# Patient Record
Sex: FEMALE | Race: WHITE | NOT HISPANIC OR LATINO | URBAN - METROPOLITAN AREA
[De-identification: names, ages, dates, MRNs, and addresses within clinical notes are randomized per-mention and may not be internally consistent; named-entity substitution may affect disease eponyms.]

---

## 2017-10-06 ENCOUNTER — IMPORTED ENCOUNTER (OUTPATIENT)
Dept: URBAN - METROPOLITAN AREA CLINIC 50 | Facility: CLINIC | Age: 67
End: 2017-10-06

## 2017-11-06 ENCOUNTER — IMPORTED ENCOUNTER (OUTPATIENT)
Dept: URBAN - METROPOLITAN AREA CLINIC 50 | Facility: CLINIC | Age: 67
End: 2017-11-06

## 2018-03-15 ENCOUNTER — IMPORTED ENCOUNTER (OUTPATIENT)
Dept: URBAN - METROPOLITAN AREA CLINIC 50 | Facility: CLINIC | Age: 68
End: 2018-03-15

## 2018-03-19 ENCOUNTER — IMPORTED ENCOUNTER (OUTPATIENT)
Dept: URBAN - METROPOLITAN AREA CLINIC 50 | Facility: CLINIC | Age: 68
End: 2018-03-19

## 2018-04-06 ENCOUNTER — IMPORTED ENCOUNTER (OUTPATIENT)
Dept: URBAN - METROPOLITAN AREA CLINIC 50 | Facility: CLINIC | Age: 68
End: 2018-04-06

## 2018-06-08 ENCOUNTER — IMPORTED ENCOUNTER (OUTPATIENT)
Dept: URBAN - METROPOLITAN AREA CLINIC 50 | Facility: CLINIC | Age: 68
End: 2018-06-08

## 2018-06-08 NOTE — PATIENT DISCUSSION
"""Recommend Bilateral Levator Repair 06/19/2018 at [facility].  Discussed with patient RBCLAY

## 2018-06-29 ENCOUNTER — IMPORTED ENCOUNTER (OUTPATIENT)
Dept: URBAN - METROPOLITAN AREA CLINIC 50 | Facility: CLINIC | Age: 68
End: 2018-06-29

## 2018-08-02 ENCOUNTER — IMPORTED ENCOUNTER (OUTPATIENT)
Dept: URBAN - METROPOLITAN AREA CLINIC 50 | Facility: CLINIC | Age: 68
End: 2018-08-02

## 2018-09-01 NOTE — PATIENT DISCUSSION
"""Rec patient have a bilateral leavator repair.  Routing sheet sent to [place] altered mental status

## 2018-11-15 ENCOUNTER — IMPORTED ENCOUNTER (OUTPATIENT)
Dept: URBAN - METROPOLITAN AREA CLINIC 50 | Facility: CLINIC | Age: 68
End: 2018-11-15

## 2019-11-14 ENCOUNTER — IMPORTED ENCOUNTER (OUTPATIENT)
Dept: URBAN - METROPOLITAN AREA CLINIC 50 | Facility: CLINIC | Age: 69
End: 2019-11-14

## 2020-11-19 ENCOUNTER — IMPORTED ENCOUNTER (OUTPATIENT)
Dept: URBAN - METROPOLITAN AREA CLINIC 50 | Facility: CLINIC | Age: 70
End: 2020-11-19

## 2021-04-17 ASSESSMENT — VISUAL ACUITY
OS_CC: 20/20
OS_CC: J1+
OD_CC: 20/20
OS_CC: 20/20-2
OD_CC: 20/20
OD_CC: J1+@ 16 IN
OS_OTHER: 20/30. 20/40.
OD_CC: 20/20
OD_OTHER: 20/30-1.
OD_CC: J1+
OS_BAT: 20/30
OD_OTHER: 20/40. 20/50.
OS_BAT: 20/25-2
OS_OTHER: 20/25.
OS_CC: 20/20-1
OS_CC: 20/20-
OS_CC: 20/25
OS_CC: 20/25+1
OD_CC: 20/20-2
OD_PH: 20/30
OD_CC: 20/20-
OD_CC: 20/25
OS_CC: J1+
OD_CC: 20/25-2
OS_CC: J1+@ 16 IN
OS_CC: 20/30+2
OD_CC: 20/20-
OD_CC: J1+
OD_CC: 20/30-1
OD_CC: J1@ 16 IN
OD_BAT: 20/25-2
OS_CC: 20/20-1
OD_BAT: 20/40
OD_OTHER: 20/30. 20/40.
OS_BAT: 20/40
OS_CC: 20/20
OS_CC: J1@ 16 IN
OS_CC: 20/30+
OD_CC: 20/20-2
OD_OTHER: 20/25-2. 20/40.
OD_BAT: 20/30
OS_OTHER: 20/40. 20/40.

## 2021-04-17 ASSESSMENT — TONOMETRY
OD_IOP_MMHG: 16
OD_IOP_MMHG: 15
OD_IOP_MMHG: 17
OD_IOP_MMHG: 18
OS_IOP_MMHG: 17
OD_IOP_MMHG: 12
OS_IOP_MMHG: 15
OD_IOP_MMHG: 14
OS_IOP_MMHG: 15
OS_IOP_MMHG: 14
OS_IOP_MMHG: 14
OD_IOP_MMHG: 15
OD_IOP_MMHG: 17
OS_IOP_MMHG: 15
OS_IOP_MMHG: 18
OS_IOP_MMHG: 16

## 2021-11-05 ENCOUNTER — PREPPED CHART (OUTPATIENT)
Dept: URBAN - METROPOLITAN AREA CLINIC 52 | Facility: CLINIC | Age: 71
End: 2021-11-05

## 2021-11-18 ENCOUNTER — ANNUAL COMPREHENSIVE EXAM (OUTPATIENT)
Dept: URBAN - METROPOLITAN AREA CLINIC 52 | Facility: CLINIC | Age: 71
End: 2021-11-18

## 2021-11-18 DIAGNOSIS — H43.813: ICD-10-CM

## 2021-11-18 DIAGNOSIS — H04.123: ICD-10-CM

## 2021-11-18 DIAGNOSIS — H52.13: ICD-10-CM

## 2021-11-18 DIAGNOSIS — H25.813: ICD-10-CM

## 2021-11-18 DIAGNOSIS — H18.593: ICD-10-CM

## 2021-11-18 PROCEDURE — 92014 COMPRE OPH EXAM EST PT 1/>: CPT

## 2021-11-18 PROCEDURE — 92015 DETERMINE REFRACTIVE STATE: CPT

## 2021-11-18 ASSESSMENT — TONOMETRY
OS_IOP_MMHG: 18
OD_IOP_MMHG: 20

## 2021-11-18 ASSESSMENT — VISUAL ACUITY
OD_GLARE: 20/40-2
OS_GLARE: 20/70
OU_CC: J1+
OS_CC: 20/40
OD_CC: 20/20-2 PUSH
OS_GLARE: 20/40
OD_GLARE: 20/40
OS_PH: 20/25

## 2023-01-05 ENCOUNTER — COMPREHENSIVE EXAM (OUTPATIENT)
Dept: URBAN - METROPOLITAN AREA CLINIC 52 | Facility: CLINIC | Age: 73
End: 2023-01-05

## 2023-01-05 DIAGNOSIS — H52.203: ICD-10-CM

## 2023-01-05 DIAGNOSIS — H18.513: ICD-10-CM

## 2023-01-05 DIAGNOSIS — H25.813: ICD-10-CM

## 2023-01-05 DIAGNOSIS — H43.813: ICD-10-CM

## 2023-01-05 DIAGNOSIS — H04.123: ICD-10-CM

## 2023-01-05 PROCEDURE — 92015 DETERMINE REFRACTIVE STATE: CPT

## 2023-01-05 PROCEDURE — 92014 COMPRE OPH EXAM EST PT 1/>: CPT

## 2023-01-05 ASSESSMENT — TONOMETRY
OD_IOP_MMHG: 16
OS_IOP_MMHG: 16

## 2023-01-05 ASSESSMENT — VISUAL ACUITY
OD_GLARE: 20/70
OU_CC: J1+
OS_PH: 20/30-2
OS_GLARE: 20/70
OS_GLARE: 20/200
OD_CC: 20/25-2
OS_CC: 20/40-1
OD_GLARE: 20/60

## 2023-02-15 PROBLEM — Z96.1: Noted: 2023-02-15

## 2023-03-01 PROBLEM — Z96.1: Noted: 2023-02-15

## 2023-03-01 PROBLEM — Z96.1: Noted: 2023-03-01

## 2024-04-05 ENCOUNTER — COMPREHENSIVE EXAM (OUTPATIENT)
Dept: URBAN - METROPOLITAN AREA CLINIC 49 | Facility: LOCATION | Age: 74
End: 2024-04-05

## 2024-04-05 DIAGNOSIS — H26.493: ICD-10-CM

## 2024-04-05 DIAGNOSIS — H43.813: ICD-10-CM

## 2024-04-05 DIAGNOSIS — H04.123: ICD-10-CM

## 2024-04-05 PROCEDURE — 92014 COMPRE OPH EXAM EST PT 1/>: CPT | Mod: 57

## 2024-04-05 PROCEDURE — 66821 AFTER CATARACT LASER SURGERY: CPT

## 2024-04-05 ASSESSMENT — TONOMETRY
OS_IOP_MMHG: 13
OD_IOP_MMHG: 13

## 2024-04-05 ASSESSMENT — VISUAL ACUITY
OS_GLARE: 20/100
OD_CC: 20/20-1
OS_GLARE: 20/50
OU_CC: J1+ @ 15IN
OD_GLARE: 20/30
OS_CC: 20/20-1
OD_GLARE: 20/25

## 2024-04-05 ASSESSMENT — KERATOMETRY
OS_K2POWER_DIOPTERS: 47.00
OS_AXISANGLE2_DEGREES: 92
OS_AXISANGLE_DEGREES: 2
OS_K1POWER_DIOPTERS: 43.75

## 2024-05-02 ENCOUNTER — FOLLOW UP (OUTPATIENT)
Dept: URBAN - METROPOLITAN AREA CLINIC 52 | Facility: CLINIC | Age: 74
End: 2024-05-02

## 2024-05-02 DIAGNOSIS — Z98.890: ICD-10-CM

## 2024-05-02 DIAGNOSIS — H52.4: ICD-10-CM

## 2024-05-02 PROCEDURE — 99024 POSTOP FOLLOW-UP VISIT: CPT

## 2024-05-02 PROCEDURE — 92015GRNC REFRACTION GLASSES RECHECK NO CHARGE

## 2024-05-02 ASSESSMENT — KERATOMETRY
OD_K1POWER_DIOPTERS: 43.75
OD_AXISANGLE_DEGREES: 180
OS_AXISANGLE2_DEGREES: 87
OD_K2POWER_DIOPTERS: 44.25
OS_K2POWER_DIOPTERS: 45.25
OS_AXISANGLE_DEGREES: 177
OS_K1POWER_DIOPTERS: 44.00
OD_AXISANGLE2_DEGREES: 90

## 2024-05-02 ASSESSMENT — TONOMETRY
OS_IOP_MMHG: 16
OD_IOP_MMHG: 14

## 2024-05-02 ASSESSMENT — VISUAL ACUITY
OS_CC: 20/20-1
OU_CC: 20/20-1
OD_CC: 20/20-1

## 2024-06-27 ENCOUNTER — CONSULTATION/EVALUATION (OUTPATIENT)
Dept: URBAN - METROPOLITAN AREA CLINIC 52 | Facility: CLINIC | Age: 74
End: 2024-06-27

## 2024-06-27 DIAGNOSIS — H26.491: ICD-10-CM

## 2024-06-27 PROCEDURE — 66821 AFTER CATARACT LASER SURGERY: CPT | Mod: 79,RT

## 2024-06-27 PROCEDURE — 99214 OFFICE O/P EST MOD 30 MIN: CPT

## 2024-06-27 RX ORDER — PREDNISOLONE ACETATE 10 MG/ML: 1 SUSPENSION/ DROPS OPHTHALMIC TWICE A DAY

## 2024-06-27 ASSESSMENT — VISUAL ACUITY
OD_GLARE: 20/30
OS_CC: 20/20-1
OU_CC: 20/20-1
OU_CC: J1+
OD_GLARE: 20/40

## 2024-06-27 ASSESSMENT — TONOMETRY
OS_IOP_MMHG: 14
OD_IOP_MMHG: 14

## 2024-07-25 ENCOUNTER — POST-OP (OUTPATIENT)
Dept: URBAN - METROPOLITAN AREA CLINIC 52 | Facility: CLINIC | Age: 74
End: 2024-07-25

## 2024-07-25 DIAGNOSIS — Z98.890: ICD-10-CM

## 2024-07-25 ASSESSMENT — VISUAL ACUITY
OD_CC: 20/20
OU_CC: J1+
OU_CC: 20/20
OS_CC: 20/20

## 2024-07-25 ASSESSMENT — TONOMETRY
OS_IOP_MMHG: 14
OD_IOP_MMHG: 15

## 2025-08-06 ENCOUNTER — COMPREHENSIVE EXAM (OUTPATIENT)
Age: 75
End: 2025-08-06

## 2025-08-06 DIAGNOSIS — H18.513: ICD-10-CM

## 2025-08-06 DIAGNOSIS — H52.203: ICD-10-CM

## 2025-08-06 DIAGNOSIS — H43.813: ICD-10-CM

## 2025-08-06 DIAGNOSIS — Z98.890: ICD-10-CM

## 2025-08-06 DIAGNOSIS — H52.4: ICD-10-CM

## 2025-08-06 DIAGNOSIS — Z96.1: ICD-10-CM

## 2025-08-06 DIAGNOSIS — H04.123: ICD-10-CM

## 2025-08-06 PROCEDURE — 99214 OFFICE O/P EST MOD 30 MIN: CPT
